# Patient Record
Sex: MALE | Race: WHITE
[De-identification: names, ages, dates, MRNs, and addresses within clinical notes are randomized per-mention and may not be internally consistent; named-entity substitution may affect disease eponyms.]

---

## 2021-04-05 ENCOUNTER — HOSPITAL ENCOUNTER (OUTPATIENT)
Dept: HOSPITAL 75 - RAD | Age: 56
End: 2021-04-05
Attending: NURSE PRACTITIONER
Payer: COMMERCIAL

## 2021-04-05 DIAGNOSIS — E03.9: Primary | ICD-10-CM

## 2021-04-05 PROCEDURE — 76536 US EXAM OF HEAD AND NECK: CPT

## 2021-04-05 NOTE — DIAGNOSTIC IMAGING REPORT
PROCEDURE: 

US Thyroid.



TECHNIQUE: 

Multiple Real-time grayscale images were obtained of the thyroid

in various projections.



INDICATION:   

Hypothyroidism.



FINDINGS:

The thyroid is nonfocal. The right lobe measures 3.7 x 0.9 x 1.4

cm and the left lobe is 4.0 x 0.9 x 0.9 cm. No solid or cystic

mass. No abnormal elevation of the color Doppler blood flow. No

parathyroidal abnormality. 



IMPRESSION: 

The thyroid is at the lower limits of size but nonfocal. No solid

or cystic mass. No fluid collection.



Dictated by: 



  Dictated on workstation # [id number]